# Patient Record
Sex: FEMALE | Race: WHITE | NOT HISPANIC OR LATINO | URBAN - METROPOLITAN AREA
[De-identification: names, ages, dates, MRNs, and addresses within clinical notes are randomized per-mention and may not be internally consistent; named-entity substitution may affect disease eponyms.]

---

## 2024-11-16 ENCOUNTER — OFFICE VISIT (OUTPATIENT)
Dept: URGENT CARE | Age: 39
End: 2024-11-16
Payer: COMMERCIAL

## 2024-11-16 VITALS
OXYGEN SATURATION: 99 % | BODY MASS INDEX: 22.5 KG/M2 | TEMPERATURE: 99.2 F | DIASTOLIC BLOOD PRESSURE: 61 MMHG | RESPIRATION RATE: 16 BRPM | HEIGHT: 66 IN | HEART RATE: 95 BPM | SYSTOLIC BLOOD PRESSURE: 100 MMHG | WEIGHT: 140 LBS

## 2024-11-16 DIAGNOSIS — S39.012A ACUTE MYOFASCIAL STRAIN OF LUMBOSACRAL REGION, INITIAL ENCOUNTER: Primary | ICD-10-CM

## 2024-11-16 DIAGNOSIS — M54.9 BACK PAIN, UNSPECIFIED BACK LOCATION, UNSPECIFIED BACK PAIN LATERALITY, UNSPECIFIED CHRONICITY: ICD-10-CM

## 2024-11-16 LAB
POC APPEARANCE, URINE: CLEAR
POC BILIRUBIN, URINE: NEGATIVE
POC BLOOD, URINE: NEGATIVE
POC COLOR, URINE: NORMAL
POC GLUCOSE, URINE: NEGATIVE MG/DL
POC KETONES, URINE: NEGATIVE MG/DL
POC LEUKOCYTES, URINE: NEGATIVE
POC NITRITE,URINE: NEGATIVE
POC PH, URINE: 6 PH
POC PROTEIN, URINE: NEGATIVE MG/DL
POC SPECIFIC GRAVITY, URINE: <=1.005
POC UROBILINOGEN, URINE: 0.2 EU/DL
PREGNANCY TEST URINE, POC: NEGATIVE

## 2024-11-16 PROCEDURE — 87086 URINE CULTURE/COLONY COUNT: CPT

## 2024-11-16 RX ORDER — CYCLOBENZAPRINE HCL 10 MG
10 TABLET ORAL NIGHTLY PRN
Qty: 5 TABLET | Refills: 0 | Status: SHIPPED | OUTPATIENT
Start: 2024-11-16 | End: 2024-11-21

## 2024-11-16 RX ORDER — LEVOTHYROXINE SODIUM 88 UG/1
88 TABLET ORAL DAILY
COMMUNITY

## 2024-11-16 RX ORDER — KETOROLAC TROMETHAMINE 30 MG/ML
30 INJECTION, SOLUTION INTRAMUSCULAR; INTRAVENOUS ONCE
Status: COMPLETED | OUTPATIENT
Start: 2024-11-16 | End: 2024-11-16

## 2024-11-16 RX ORDER — MELOXICAM 15 MG/1
15 TABLET ORAL DAILY
Qty: 5 TABLET | Refills: 0 | Status: SHIPPED | OUTPATIENT
Start: 2024-11-16 | End: 2024-11-21

## 2024-11-16 ASSESSMENT — ENCOUNTER SYMPTOMS
FLANK PAIN: 0
ABDOMINAL PAIN: 1
FREQUENCY: 0
BACK PAIN: 1
DIFFICULTY URINATING: 0
HEMATURIA: 0
DYSURIA: 0

## 2024-11-16 ASSESSMENT — PAIN SCALES - GENERAL: PAINLEVEL_OUTOF10: 7

## 2024-11-16 NOTE — PATIENT INSTRUCTIONS
Meloxicam daily as needed; take with food  Cyclobenzaprine nightly as needed; may cause drowsiness  Follow up with new or worsening symptoms

## 2024-11-16 NOTE — PROGRESS NOTES
"Subjective   Patient ID: Amelia Cespedes is a 39 y.o. female. She presents today with a chief complaint of Back Pain (3 days back pain 7/10. No known injury).    History of Present Illness  Subjective   Amelia Cespedes is a 39 y.o. female who presents for evaluation of low back pain. The patient has had no prior back problems. Symptoms have been present for 3 days and worsened last evening.  Onset was related to / precipitated by no known injury. The pain is located in the across the lower back and radiates to the abdomen . The pain is described as aching and is constant, described as menstrual cramps. Symptoms are exacerbated by  bending over . Symptoms are improved by  Ibuprofen 800mg once last night . She has also tried heat which provided no symptom relief. She has no other symptoms associated with the back pain. The patient has no \"red flag\" history indicative of complicated back pain.    Objective   Full range of motion without pain, no tenderness, no spasm, no curvature.  Normal reflexes, gait, strength and negative straight-leg raise.    Assessment/Plan     Past Medical History  Allergies as of 11/16/2024    (No Known Allergies)       (Not in a hospital admission)       History reviewed. No pertinent past medical history.    History reviewed. No pertinent surgical history.     reports that she has never smoked. She has never used smokeless tobacco.    Review of Systems  Review of Systems   Gastrointestinal:  Positive for abdominal pain.   Genitourinary:  Negative for difficulty urinating, dysuria, flank pain, frequency, hematuria and urgency.   Musculoskeletal:  Positive for back pain.                                  Objective    Vitals:    11/16/24 1235   BP: 100/61   BP Location: Left arm   Patient Position: Sitting   BP Cuff Size: Adult   Pulse: 95   Resp: 16   Temp: 37.3 °C (99.2 °F)   TempSrc: Oral   SpO2: 99%   Weight: 63.5 kg (140 lb)   Height: 1.676 m (5' 6\")     Patient's last menstrual period " was 10/24/2024 (approximate).    Physical Exam  Constitutional:       General: She is not in acute distress.     Appearance: Normal appearance. She is normal weight. She is not toxic-appearing or diaphoretic.   Eyes:      Extraocular Movements: Extraocular movements intact.      Pupils: Pupils are equal, round, and reactive to light.   Cardiovascular:      Pulses: Normal pulses.   Pulmonary:      Effort: Pulmonary effort is normal.   Abdominal:      General: Abdomen is flat. Bowel sounds are normal. There is no distension.      Palpations: Abdomen is soft. There is no mass.      Tenderness: There is no abdominal tenderness. There is no right CVA tenderness, left CVA tenderness, guarding or rebound.      Hernia: No hernia is present.   Musculoskeletal:         General: No swelling, tenderness, deformity or signs of injury. Normal range of motion.      Cervical back: Normal, normal range of motion and neck supple. No rigidity or tenderness.      Thoracic back: Normal.      Lumbar back: No bony tenderness. Negative right straight leg raise test and negative left straight leg raise test.      Right lower leg: No edema.      Left lower leg: No edema.   Lymphadenopathy:      Cervical: No cervical adenopathy.   Skin:     General: Skin is warm and dry.      Capillary Refill: Capillary refill takes less than 2 seconds.   Neurological:      General: No focal deficit present.      Mental Status: She is alert and oriented to person, place, and time.         Procedures    Point of Care Test & Imaging Results from this visit  No results found for this visit on 11/16/24.   No results found.    Diagnostic study results (if any) were reviewed by Aleksandra Han MD.    Assessment/Plan   Allergies, medications, history, and pertinent labs/EKGs/Imaging reviewed by Aleksandra Han MD.       Orders and Diagnoses  There are no diagnoses linked to this encounter.    Medical Admin Record      Patient disposition: Home    Electronically signed  by Aleksandra Han MD  12:43 PM

## 2024-11-18 LAB — BACTERIA UR CULT: NO GROWTH
